# Patient Record
Sex: FEMALE | Race: WHITE | NOT HISPANIC OR LATINO | Employment: OTHER | URBAN - METROPOLITAN AREA
[De-identification: names, ages, dates, MRNs, and addresses within clinical notes are randomized per-mention and may not be internally consistent; named-entity substitution may affect disease eponyms.]

---

## 2017-07-28 ENCOUNTER — OFFICE VISIT (OUTPATIENT)
Dept: URGENT CARE | Facility: PHYSICIAN GROUP | Age: 27
End: 2017-07-28
Payer: COMMERCIAL

## 2017-07-28 VITALS
WEIGHT: 230 LBS | OXYGEN SATURATION: 95 % | HEIGHT: 68 IN | DIASTOLIC BLOOD PRESSURE: 88 MMHG | HEART RATE: 109 BPM | SYSTOLIC BLOOD PRESSURE: 130 MMHG | TEMPERATURE: 98.8 F | BODY MASS INDEX: 34.86 KG/M2

## 2017-07-28 DIAGNOSIS — S61.309A TRAUMATIC AVULSION OF NAIL PLATE OF FINGER, INITIAL ENCOUNTER: ICD-10-CM

## 2017-07-28 DIAGNOSIS — Z23 NEED FOR TDAP VACCINATION: ICD-10-CM

## 2017-07-28 PROCEDURE — 90471 IMMUNIZATION ADMIN: CPT | Performed by: EMERGENCY MEDICINE

## 2017-07-28 PROCEDURE — 90715 TDAP VACCINE 7 YRS/> IM: CPT | Performed by: EMERGENCY MEDICINE

## 2017-07-28 PROCEDURE — 11730 AVULSION NAIL PLATE SIMPLE 1: CPT | Performed by: EMERGENCY MEDICINE

## 2017-07-28 ASSESSMENT — ENCOUNTER SYMPTOMS
WEAKNESS: 0
NUMBNESS: 0
FOCAL WEAKNESS: 0
FEVER: 0
SENSORY CHANGE: 0
TINGLING: 0

## 2017-07-28 NOTE — PROGRESS NOTES
Subjective:      Lindsay Gordon is a 26 y.o. female who presents with Finger Injury            Nail Problem  This is a new problem. The current episode started yesterday. Pertinent negatives include no fever, numbness, rash or weakness. Nothing aggravates the symptoms. She has tried nothing for the symptoms.    states right ring finger nail plate partially avulsed when dog ran into the finger. Denies additional injury or prior injury. Has gel artificial nails that cannot be removed, only filed off. Uncertain last tetanus immunization.    Review of Systems   Constitutional: Negative for fever.   Musculoskeletal:        No pain proximal to the site.   Skin: Negative for rash.   Neurological: Negative for tingling, sensory change, focal weakness, weakness and numbness.     PMH:  has no past medical history on file.  MEDS:   Current outpatient prescriptions:   •  Etonogestrel-Ethinyl Estradiol (NUVARING VA), Insert  in vagina., Disp: , Rfl:   •  norgestrel-ethinyl estradiol (LO-OVRAL) 0.3-30 MG-MCG TABS, Take 1 Tab by mouth every day. Tri-sprintec, Disp: , Rfl:   •  alprazolam (XANAX) 0.5 MG TABS, Take 0.5 mg by mouth 3 times a day as needed., Disp: , Rfl:   •  metoclopramide (REGLAN) 10 MG TABS, Take 1 Tab by mouth 4 Times a Day,Before Meals and at Bedtime., Disp: 24 Tab, Rfl: 0  •  omeprazole (PRILOSEC) 20 MG CPDR, Take 1 Cap by mouth every day., Disp: 30 Cap, Rfl: 0  ALLERGIES:   Allergies   Allergen Reactions   • Morphine Vomiting     SURGHX:   Past Surgical History   Procedure Laterality Date   • Primary c section  8/15/2011     Performed by JESSICA FREEMAN at [facility]   • Appendectomy child       SOCHX:  reports that she has never smoked. She does not have any smokeless tobacco history on file. She reports that she drinks alcohol. She reports that she does not use illicit drugs.  FH: family history is not on file.       Objective:     /88 mmHg  Pulse 109  Temp(Src) 37.1 °C (98.8 °F)   "Ht 1.727 m (5' 8\")  Wt 104.327 kg (230 lb)  BMI 34.98 kg/m2  SpO2 95%     Physical Exam   Constitutional: She appears well-developed and well-nourished. She is cooperative. She does not have a sickly appearance. She does not appear ill.   Cardiovascular:   Capillary refill is normal.   Musculoskeletal:        Right hand: She exhibits normal range of motion, no bony tenderness and no deformity.        Hands:  Neurological: She is alert.   Distal sensation to light touch and pressure intact.   Skin: Skin is warm and dry. No rash noted.        Psychiatric: She has a normal mood and affect.     Advised patient that nail plate avulsion is near complete; unable to visualize nail bed for associated injury. Advised nail plate removal for examination, cleansing, and residual nail plate replacement to splint the nail fold. Patient agreed.     Procedure: Nail plate removal.  Digital block anesthesia with 2% lidocaine/0.5% bupivacaine 4 cc.  Betadine antisepsis, sterile technique.  Removal of nail plate atraumatically.  Nail bed intact, cleansed with Betadine and irrigated with saline.  Trimmed nail plate short enough to cover nail bed region, and replaced under proximal nail fold.  Nail plate Steri-Stripped in place due to thickened nail/gel coat and likely inability to pass a suture.  Dressing applied.  Patient tolerated procedure well.     Assessment/Plan:     1. Traumatic avulsion of nail plate of finger, initial encounter  Nail plate removal and replacement  Advised wound check in 2 days, anticipate remaining initial nail plate removal in about a week.    2. Need for Tdap vaccination  - Tdap =>8yo IM        "

## 2017-07-28 NOTE — MR AVS SNAPSHOT
"        Lindsay Ortegat   2017 1:35 PM   Office Visit   MRN: [id number]    Department:  [facility]   Dept Phone:  [phone]    Description:  Female : 1990   Provider:  Jacob Guzmán M.D.           Reason for Visit     Finger Injury right hand 4th finger. Lifted nail.       Allergies as of 2017     Allergen Noted Reactions    Morphine 2011   Vomiting      You were diagnosed with     Traumatic avulsion of nail plate of finger, initial encounter   [[id number]]       Need for Tdap vaccination   [[id number]]         Vital Signs     Blood Pressure Pulse Temperature Height Weight Body Mass Index    130/88 mmHg 109 37.1 °C (98.8 °F) 1.727 m (5' 8\") 104.327 kg (230 lb) 34.98 kg/m2    Oxygen Saturation Smoking Status                95% Never Smoker           Basic Information     Date Of Birth Sex Race Ethnicity Preferred Language    1990 Female White Non- English      Health Maintenance     Patient has no pending health maintenance at this time      Current Immunizations     Tdap Vaccine 2017, 2011  4:45 PM      Below and/or attached are the medications your provider expects you to take. Review all of your home medications and newly ordered medications with your provider and/or pharmacist. Follow medication instructions as directed by your provider and/or pharmacist. Please keep your medication list with you and share with your provider. Update the information when medications are discontinued, doses are changed, or new medications (including over-the-counter products) are added; and carry medication information at all times in the event of emergency situations     Allergies:  MORPHINE - Vomiting               Medications  Valid as of: 2017 -  3:59 PM    Generic Name Brand Name Tablet Size Instructions for use    ALPRAZolam (Tab) XANAX 0.5 MG Take 0.5 mg by mouth 3 times a day as needed.        Etonogestrel-Ethinyl Estradiol   Insert  in vagina.       " Metoclopramide HCl (Tab) REGLAN 10 MG Take 1 Tab by mouth 4 Times a Day,Before Meals and at Bedtime.        Norgestrel-Ethinyl Estradiol (Tab) LO-OVRAL 0.3-30 MG-MCG Take 1 Tab by mouth every day. Tri-sprintec        Omeprazole (CAPSULE DELAYED RELEASE) PRILOSEC 20 MG Take 1 Cap by mouth every day.        .                 Medicines prescribed today were sent to:     None      Medication refill instructions:       If your prescription bottle indicates you have medication refills left, it is not necessary to call your provider’s office. Please contact your pharmacy and they will refill your medication.    If your prescription bottle indicates you do not have any refills left, you may request refills at any time through one of the following ways: The online FanGo system (except Urgent Care), by calling your provider’s office, or by asking your pharmacy to contact your provider’s office with a refill request. Medication refills are processed only during regular business hours and may not be available until the next business day. Your provider may request additional information or to have a follow-up visit with you prior to refilling your medication.   *Please Note: Medication refills are assigned a new Rx number when refilled electronically. Your pharmacy may indicate that no refills were authorized even though a new prescription for the same medication is available at the pharmacy. Please request the medicine by name with the pharmacy before contacting your provider for a refill.        Instructions    Use over-the-counter pain reliever, such as acetaminophen (Tylenol), ibuprofen (Advil, Motrin) or naproxen (Aleve) as needed; follow package directions for dosing.  Rest and elevate hand today.  Leave the initial dressing in place, clean and dry, for the next 24 hours.  Then, clean the area daily with mild soap and rinse well with water, pat dry with disposable paper towel.  Leave the applied Steri-strips in place for  at least one week.  You may wash the area, but avoid scrubbing the area.  Allow the strips to dry before covering the area if needed.  Cover with clean dressing, such as Telfa pad, and hold in place with tape or rolled gauze.  Plan to return for wound recheck in 2 days.  Recheck with physician promptly if any increasing pain, worsening redness, swelling or discharge at the site.    Nail Avulsion Injury  Nail avulsion means that you have lost the whole, or part of a nail. The nail will usually grow back in 2 to 6 months. If your injury damaged the growth center of the nail, the nail may be deformed, split, or not stuck to the nail bed. Sometimes the avulsed nail is stitched back in place. This provides temporary protection to the nail bed until the new nail grows in.   HOME CARE INSTRUCTIONS   · Raise (elevate) your injury as much as possible.  · Protect the injury and cover it with bandages (dressings) or splints as instructed.  · Change dressings as instructed.  SEEK MEDICAL CARE IF:   · There is increasing pain, redness, or swelling.  · You cannot move your fingers or toes.     This information is not intended to replace advice given to you by your health care provider. Make sure you discuss any questions you have with your health care provider.     Document Released: 01/25/2006 Document Revised: 03/11/2013 Document Reviewed: 11/19/2010  [employer] Interactive Patient Education ©2016 Elsevier Inc.            Mercari Access Code: [email or web address]  Expires: 8/27/2017  3:59 PM    Mercari  A secure, online tool to manage your health information     [employer] Mercari® is a secure, online tool that connects you to your personalized health information from the privacy of your home -- day or night - making it very easy for you to manage your healthcare. Once the activation process is completed, you can even access your medical information using the Mercari patrick, which is available for free in the Apple Patrick store or  Google Play store.     SavvySystems provides the following levels of access (as shown below):   My Chart Features   [facility] Doctor [facility]  Specialists [facility]  Urgent  Care [facility]  Primary Care  Doctor   Email your healthcare team securely and privately 24/7 X X X    Manage appointments: schedule your next appointment; view details of past/upcoming appointments X      Request prescription refills. X      View recent personal medical records, including lab and immunizations X X X X   View health record, including health history, allergies, medications X X X X   Read reports about your outpatient visits, procedures, consult and ER notes X X X X   See your discharge summary, which is a recap of your hospital and/or ER visit that includes your diagnosis, lab results, and care plan. X X       How to register for SavvySystems:  1. Go to  [email or web address].  2. Click on the Sign Up Now box, which takes you to the New Member Sign Up page. You will need to provide the following information:  a. Enter your SavvySystems Access Code exactly as it appears at the top of this page. (You will not need to use this code after you’ve completed the sign-up process. If you do not sign up before the expiration date, you must request a new code.)   b. Enter your date of birth.   c. Enter your home email address.   d. Click Submit, and follow the next screen’s instructions.  3. Create a SavvySystems ID. This will be your SavvySystems login ID and cannot be changed, so think of one that is secure and easy to remember.  4. Create a SavvySystems password. You can change your password at any time.  5. Enter your Password Reset Question and Answer. This can be used at a later time if you forget your password.   6. Enter your e-mail address. This allows you to receive e-mail notifications when new information is available in SavvySystems.  7. Click Sign Up. You can now view your health information.    For assistance activating your SavvySystems account, call  [phone]

## 2017-07-28 NOTE — PATIENT INSTRUCTIONS
Use over-the-counter pain reliever, such as acetaminophen (Tylenol), ibuprofen (Advil, Motrin) or naproxen (Aleve) as needed; follow package directions for dosing.  Rest and elevate hand today.  Leave the initial dressing in place, clean and dry, for the next 24 hours.  Then, clean the area daily with mild soap and rinse well with water, pat dry with disposable paper towel.  Leave the applied Steri-strips in place for at least one week.  You may wash the area, but avoid scrubbing the area.  Allow the strips to dry before covering the area if needed.  Cover with clean dressing, such as Telfa pad, and hold in place with tape or rolled gauze.  Plan to return for wound recheck in 2 days.  Recheck with physician promptly if any increasing pain, worsening redness, swelling or discharge at the site.    Nail Avulsion Injury  Nail avulsion means that you have lost the whole, or part of a nail. The nail will usually grow back in 2 to 6 months. If your injury damaged the growth center of the nail, the nail may be deformed, split, or not stuck to the nail bed. Sometimes the avulsed nail is stitched back in place. This provides temporary protection to the nail bed until the new nail grows in.   HOME CARE INSTRUCTIONS   · Raise (elevate) your injury as much as possible.  · Protect the injury and cover it with bandages (dressings) or splints as instructed.  · Change dressings as instructed.  SEEK MEDICAL CARE IF:   · There is increasing pain, redness, or swelling.  · You cannot move your fingers or toes.     This information is not intended to replace advice given to you by your health care provider. Make sure you discuss any questions you have with your health care provider.     Document Released: 01/25/2006 Document Revised: 03/11/2013 Document Reviewed: 11/19/2010  E.M.A.R.C. Interactive Patient Education ©2016 Elsevier Inc.

## 2018-08-28 ENCOUNTER — HOSPITAL ENCOUNTER (EMERGENCY)
Facility: MEDICAL CENTER | Age: 28
End: 2018-08-28
Attending: EMERGENCY MEDICINE
Payer: MEDICAID

## 2018-08-28 ENCOUNTER — APPOINTMENT (OUTPATIENT)
Dept: RADIOLOGY | Facility: MEDICAL CENTER | Age: 28
End: 2018-08-28
Attending: EMERGENCY MEDICINE
Payer: MEDICAID

## 2018-08-28 VITALS
RESPIRATION RATE: 18 BRPM | HEART RATE: 54 BPM | SYSTOLIC BLOOD PRESSURE: 127 MMHG | DIASTOLIC BLOOD PRESSURE: 75 MMHG | BODY MASS INDEX: 30.31 KG/M2 | TEMPERATURE: 97.4 F | HEIGHT: 68 IN | WEIGHT: 200 LBS | OXYGEN SATURATION: 98 %

## 2018-08-28 DIAGNOSIS — S63.502A WRIST SPRAIN, LEFT, INITIAL ENCOUNTER: ICD-10-CM
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
## 2018-08-28 PROCEDURE — 73110 X-RAY EXAM OF WRIST: CPT | Mod: LT

## 2018-08-28 PROCEDURE — 99284 EMERGENCY DEPT VISIT MOD MDM: CPT

## 2018-08-28 PROCEDURE — 72125 CT NECK SPINE W/O DYE: CPT

## 2018-08-28 PROCEDURE — 307740 HCHG GREEN TRAUMA TEAM SERVICES

## 2018-08-28 ASSESSMENT — PAIN SCALES - GENERAL: PAINLEVEL_OUTOF10: 6
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
## 2019-02-21 ENCOUNTER — TELEPHONE (OUTPATIENT)
Dept: SCHEDULING | Facility: IMAGING CENTER | Age: 29
End: 2019-02-21

## 2019-04-03 ENCOUNTER — OFFICE VISIT (OUTPATIENT)
Dept: INTERNAL MEDICINE | Facility: MEDICAL CENTER | Age: 29
End: 2019-04-03
Payer: MEDICAID

## 2019-04-03 VITALS
SYSTOLIC BLOOD PRESSURE: 118 MMHG | HEART RATE: 60 BPM | BODY MASS INDEX: 33.84 KG/M2 | RESPIRATION RATE: 18 BRPM | DIASTOLIC BLOOD PRESSURE: 66 MMHG | TEMPERATURE: 98.5 F | WEIGHT: 215.6 LBS | OXYGEN SATURATION: 94 % | HEIGHT: 67 IN

## 2019-04-03 DIAGNOSIS — E66.09 CLASS 1 OBESITY DUE TO EXCESS CALORIES WITHOUT SERIOUS COMORBIDITY WITH BODY MASS INDEX (BMI) OF 34.0 TO 34.9 IN ADULT: ICD-10-CM

## 2019-04-03 DIAGNOSIS — K31.84 GASTROPARESIS: ICD-10-CM

## 2019-04-03 DIAGNOSIS — Z83.2 FAMILY HISTORY OF AUTOIMMUNE DISORDER: ICD-10-CM

## 2019-04-03 DIAGNOSIS — Z71.1 CONCERN ABOUT STD IN FEMALE WITHOUT DIAGNOSIS: ICD-10-CM

## 2019-04-03 DIAGNOSIS — M93.1 KIENBOCK'S DISEASE OF LUNATE BONE OF LEFT WRIST IN ADULT: ICD-10-CM

## 2019-04-03 DIAGNOSIS — F12.90 MARIJUANA USE: ICD-10-CM

## 2019-04-03 DIAGNOSIS — Z00.00 HEALTHCARE MAINTENANCE: ICD-10-CM

## 2019-04-03 DIAGNOSIS — R00.2 PALPITATIONS: ICD-10-CM

## 2019-04-03 DIAGNOSIS — M25.50 GENERALIZED JOINT PAIN: ICD-10-CM

## 2019-04-03 DIAGNOSIS — R21 RASH OF FACE: ICD-10-CM

## 2019-04-03 DIAGNOSIS — F51.01 PRIMARY INSOMNIA: ICD-10-CM

## 2019-04-03 DIAGNOSIS — R39.15 URINARY URGENCY: ICD-10-CM

## 2019-04-03 LAB
APPEARANCE UR: CLEAR
BILIRUB UR STRIP-MCNC: NEGATIVE MG/DL
COLOR UR AUTO: YELLOW
GLUCOSE UR STRIP.AUTO-MCNC: NEGATIVE MG/DL
KETONES UR STRIP.AUTO-MCNC: NEGATIVE MG/DL
LEUKOCYTE ESTERASE UR QL STRIP.AUTO: NEGATIVE
NITRITE UR QL STRIP.AUTO: NEGATIVE
PH UR STRIP.AUTO: 5 [PH] (ref 5–8)
PROT UR QL STRIP: NEGATIVE MG/DL
RBC UR QL AUTO: NEGATIVE
SP GR UR STRIP.AUTO: 1.03
UROBILINOGEN UR STRIP-MCNC: 0.2 MG/DL

## 2019-04-03 PROCEDURE — 99999 PR NO CHARGE: CPT | Performed by: INTERNAL MEDICINE

## 2019-04-03 PROCEDURE — 93000 ELECTROCARDIOGRAM COMPLETE: CPT | Mod: GC | Performed by: STUDENT IN AN ORGANIZED HEALTH CARE EDUCATION/TRAINING PROGRAM

## 2019-04-03 PROCEDURE — 81002 URINALYSIS NONAUTO W/O SCOPE: CPT | Mod: GC | Performed by: STUDENT IN AN ORGANIZED HEALTH CARE EDUCATION/TRAINING PROGRAM

## 2019-04-03 PROCEDURE — 99214 OFFICE O/P EST MOD 30 MIN: CPT | Mod: GC | Performed by: INTERNAL MEDICINE

## 2019-04-03 RX ORDER — ETONOGESTREL/ETHINYL ESTRADIOL .12-.015MG
1 RING, VAGINAL VAGINAL ONCE
Refills: 4 | COMMUNITY
Start: 2019-04-01 | End: 2019-04-03

## 2019-04-03 ASSESSMENT — ENCOUNTER SYMPTOMS
SORE THROAT: 0
VOMITING: 0
DIARRHEA: 0
NAUSEA: 0
BLURRED VISION: 0
PALPITATIONS: 1
SHORTNESS OF BREATH: 0
HEADACHES: 0
POLYDIPSIA: 0
DOUBLE VISION: 0
DIZZINESS: 0
FEVER: 0
INSOMNIA: 1
COUGH: 1
MYALGIAS: 0
SINUS PAIN: 1
HEARTBURN: 1
CONSTIPATION: 0
SPUTUM PRODUCTION: 1
CHILLS: 0

## 2019-04-03 ASSESSMENT — LIFESTYLE VARIABLES: SUBSTANCE_ABUSE: 1

## 2019-04-03 ASSESSMENT — PATIENT HEALTH QUESTIONNAIRE - PHQ9: CLINICAL INTERPRETATION OF PHQ2 SCORE: 0

## 2019-04-03 ASSESSMENT — PAIN SCALES - GENERAL: PAINLEVEL: NO PAIN

## 2019-04-03 NOTE — PROGRESS NOTES
New Patient to Establish    Reason to establish: New patient to establish    CC: Auto-immune disease    HPI:   The patient is a 28 year old female with a family history of autoimmune disease. Her sister has Raynaud's phenomenon and her mother has an autoimmune disease that is unknown.     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     Gastroparesis: The patient also has gastroparesis. She saw a gastroenterologist with [facility] who told her she had gastroparesis with gastric delay test.  This has been under control with diet modifications and taking over the counter supplements.     Rash: She states she has had red and patchy skin for a while and she has been tired recently. Her rash can be itchy at times. The patient gets round flakey patches on face. The patient gets it on her cheeks, temples, and forehead. This has been going on since October. Her face would break out for a couple of weeks and then it would go away.     Joint pain: The patient complains of achy joints usually at the end of the day. She states that it is in her shoulders, hands wrists fingers and and hips. Her knees are ok but her ankles and feet are always in pain. It is an achy feeling in her joints.     Cough/allergies: Patient has also been having a cough productive of white yellow plegm. No fevers and chills. She has a chronic cough from post-nasal drip. Zyrtec has been helping. She has been using nasal sprays to help as well.      Urinary urgency: Patient has also been having urgency. She feels like she needs to urinate but only a little bit comes out. She feels like there is pressure to urinate more. Sometimes she has dysuria. This has been happening for a couple of months.     History of arrythmia: The patient states she had a history of sinus arrhythmia when she was 15 years old. She was on a holter monitor when she was younger. She never followed up but was told  she had a sinus arrhythmia.     Healthcare maintenance:  Pap smear: Patient is due in July with GYN  Tdap: Up to date.    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       No current facility-administered medications for this visit.        Allergies as of 04/03/2019 - Reviewed 04/03/2019   Allergen Reaction Noted   • Morphine Vomiting 08/16/2011     Social History     Social History   • Marital status:      Spouse name: N/A   • Number of children: N/A   • Years of education: N/A     Occupational History   • Not on file.     Social History Main Topics   • Smoking status: Never Smoker   • Smokeless tobacco: Never Used   • Alcohol use Yes      Comment: rarely   • Drug use: Yes     Types: Marijuana      Comment: medical   • Sexual activity: Not on file     Other Topics Concern   • Not on file     Social History Narrative   • No narrative on file       No family history on file.    Past Surgical History:   Procedure Laterality Date   • PRIMARY C SECTION  8/15/2011    Performed by JESSICA FREEMAN at [facility]   • APPENDECTOMY CHILD         ROS: As per HPI. Additional pertinent symptoms as noted below.    Review of Systems   Constitutional: Negative for chills and fever.   HENT: Positive for congestion and sinus pain. Negative for sore throat.         Allergic congestion   Eyes: Negative for blurred vision and double vision.   Respiratory: Positive for cough and sputum production. Negative for shortness of breath.    Cardiovascular: Positive for palpitations. Negative for chest pain.   Gastrointestinal: Positive for heartburn (Chronic heartburn when she eats  "fast food). Negative for constipation, diarrhea, nausea and vomiting.   Genitourinary: Positive for urgency. Negative for dysuria and hematuria.   Musculoskeletal: Positive for joint pain. Negative for myalgias.   Skin: Positive for rash.   Neurological: Negative for dizziness and headaches.   Endo/Heme/Allergies: Positive for environmental allergies. Negative for polydipsia.   Psychiatric/Behavioral: Positive for substance abuse. The patient has insomnia.      /66 (BP Location: Left arm, Patient Position: Sitting, BP Cuff Size: Adult)   Pulse 60   Temp 36.9 °C (98.5 °F) (Temporal)   Resp 18   Ht 1.69 m (5' 6.53\")   Wt 97.8 kg (215 lb 9.6 oz)   LMP 03/08/2019   SpO2 94%   Breastfeeding? No   BMI 34.24 kg/m²     Physical Exam  General:  Alert and oriented, No apparent distress.    Eyes: Pupils equal and reactive. No scleral icterus.    Throat: Clear no erythema or exudates noted.    Neck: Supple. No lymphadenopathy noted. Thyroid not enlarged.    Lungs: Clear to auscultation and percussion bilaterally.    Cardiovascular: Regular rate and rhythm. No murmurs, rubs or gallops.    Abdomen:  Benign. No rebound or guarding noted.    Extremities: No clubbing, cyanosis, edema.    Skin: There is some redness and dryness on the patient's face over her cheeks and forehead. Does not appear psoriatic.       Assessment and Plan    Rash of face  Joint pain  Family history of autoimmune conditions  - New  - Given facial rash, joint pain, family history of Raynaud's, Lupus, and other autoimmune conditions, we are trying to rule out Lupus.   - Follow-up [facility] with reflex, CBC, CMP, and TSH    Palpitations  - Chronic, not improving  - EKG in clinic slowed normal sinus rhythm with bradycardia at 48.   - Follow-up TSH with reflex to Free T4    Insomnia  - Chronic, under control  - Continue Melatonin  - Counseled patient regarding good sleep hygiene, limiting electronic use before bedtime, avoiding daytime naps.      Urinary " urgency  - New  - Urinalysis in Clinic was normal and did not indicate an infection. No signs and symptoms of an infection at this time.   - Will continue to monitor    Gastroparesis  - Chronic, improving  - Provided diet education counseling including eating healthy fruits and vegetables and continuing over the counter supplements to aid digestion.     Obesity, class 1  - Chronic, not improving  - Talked to patient about weight loss. Counseled regarding how weight loss means that you have to take in fewer calories than taking out. Counseled on limiting fast food and sticking to fresh fruits and vegetables.     Concerns for STD  Birth Control  - New  - Patient is recently . Has had a new partner and has not been STD tested.   - Follow-up HIV, RPR, Chlamydia and gonorrhea  - Continue Nuvaring    Healthcare maintenance  - Pap smear: Patient is scheduled in July with GYN.     Followup: Return in about 5 weeks (around 5/8/2019) for Long.    Signed by: Baljeet Dsouza M.D.

## 2019-04-09 DIAGNOSIS — R00.2 PALPITATIONS: ICD-10-CM

## 2019-04-09 DIAGNOSIS — Z83.2 FAMILY HISTORY OF AUTOIMMUNE DISORDER: ICD-10-CM

## 2019-04-09 DIAGNOSIS — Z00.00 HEALTHCARE MAINTENANCE: ICD-10-CM

## 2019-04-09 DIAGNOSIS — E66.09 CLASS 1 OBESITY DUE TO EXCESS CALORIES WITHOUT SERIOUS COMORBIDITY WITH BODY MASS INDEX (BMI) OF 34.0 TO 34.9 IN ADULT: ICD-10-CM

## 2019-04-09 DIAGNOSIS — R39.15 URINARY URGENCY: ICD-10-CM

## 2019-04-09 DIAGNOSIS — Z71.1 CONCERN ABOUT STD IN FEMALE WITHOUT DIAGNOSIS: ICD-10-CM

## 2019-04-09 DIAGNOSIS — R21 RASH OF FACE: ICD-10-CM

## 2019-05-08 ENCOUNTER — APPOINTMENT (OUTPATIENT)
Dept: INTERNAL MEDICINE | Facility: MEDICAL CENTER | Age: 29
End: 2019-05-08
Payer: MEDICAID
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
